# Patient Record
Sex: FEMALE | Race: ASIAN
[De-identification: names, ages, dates, MRNs, and addresses within clinical notes are randomized per-mention and may not be internally consistent; named-entity substitution may affect disease eponyms.]

---

## 2020-03-27 ENCOUNTER — HOSPITAL ENCOUNTER (EMERGENCY)
Dept: HOSPITAL 74 - JER | Age: 65
Discharge: HOME | End: 2020-03-27
Payer: COMMERCIAL

## 2020-03-27 VITALS — DIASTOLIC BLOOD PRESSURE: 100 MMHG | TEMPERATURE: 100.1 F | SYSTOLIC BLOOD PRESSURE: 112 MMHG | HEART RATE: 84 BPM

## 2020-03-27 DIAGNOSIS — R50.9: Primary | ICD-10-CM

## 2020-03-27 DIAGNOSIS — Z20.828: ICD-10-CM

## 2020-03-27 NOTE — PDOC
Rapid Medical Evaluation


Chief Complaint: Cold Symptoms


Time Seen by Provider: 03/27/20 18:37


Medical Evaluation: 


                                    Allergies











Allergy/AdvReac Type Severity Reaction Status Date / Time


 


No Known Allergies Allergy   Verified 03/27/20 18:35








Vital Signs











Temp Pulse Resp BP Pulse Ox


 


 100.1 F H  84   19   112/100   98 


 


 03/27/20 18:35  03/27/20 18:35  03/27/20 18:35  03/27/20 18:35  03/27/20 18:35








03/27/20 18:41





HPI:


COVID-19 CDC guideline data points:


 


The patient is a 64-year-old female with HLD who presents with suspected COVID-

19 with associated symptoms of fever and vomiting.


 





ROS:


NEGATIVE:  difficulty breathing, shortness of breath, chest pain, 

lightheadedness, dizziness, nausea, vomiting and diarrhea.  Other 12 point ROS 

reviewed and negative.





Exam:


General: NAD, Well-Appearing, Awake, Alert Oriented x3.  Vital signs stable.


ENT: No rhinorrhea or nasal congestion.


Neck: FROM, no midline tenderness.


Lungs: Clear to auscultation bilaterally without wheezes, rhonchi or rales.  

Normal excursion.  Patient is able to speak in full sentences.


Heart: HR: Regular rhythm, S1-S2 present, no murmurs rubs or gallops.


Abdomen: Non-distended.


MSK/Extremities: No decrease ROM, No obvious deformities.  No obvious cyanosis 

noted.


Neuro: Normal Gait, Cranial Nerves II through XII Grossly Intact.


Skin: No obvious rashes, bruising.  Color Normal Appearing.





Assessment/Plan: 


Denies recent travel and known COVID exposure.  


Patient does not meet testing criteria at this time.








 


ASSESSMENT:


Denies recent travel and known Covid exposure. 





Treatment:


Zofran rx


Tylenol


Isolation


Drive-thru testing


ED return precautions reviewed

















**Discharge Disposition





- Diagnosis


 Fever








- Discharge Dispostion


Disposition: HOME


Condition at time of disposition: Stable


Last Admission D/C Date: 08/15/16


Decision to Admit order: No





- Prescriptions


Prescriptions: 


Ondansetron [Zofran *Odt*] 4 mg SL TID #21 od.tablet





- Referrals





- Patient Instructions


Printed Discharge Instructions:  [facility]-Coronavirus Instructions, [facility] COVID-19 Isolation Protocol


Additional Instructions: 


Take Zofran for nausea as prescribed


Take Tylenol as needed for fever


Drink plenty of fluids





ISOLATE as described in the attached materials





Return here if you are unable to keep down fluids or for any life-threatening 

symptoms





You were seen for your cough and possible Coronavirus (COVID-19)


Please call the [facility] testing center to make an appointment at 

[phone] or you can call [facility] at [phone] from

8:30 AM to 6 PM; or you can visit the [facility] website:


[email or web address] for more 

information about testing at the [facility].


Take Tylenol 650 mg every 6 hours as needed for fever or pain.  You may take 

Robitussin or other over-the-counter cough syrup.  Follow the dosing 

instructions on the bottle.


Warm tea, honey, and salt water gargles may help your symptoms.  Please take 

precautions and self quarantine for 2 weeks and follow-up with your primary care

doctor and the Department of Health.





Return to the nearest emergency department for shortness of breath, difficulty 

breathing, chest pain, or if you have any changes in your symptoms.





- Post Discharge Activity